# Patient Record
Sex: MALE | Race: WHITE | NOT HISPANIC OR LATINO | Employment: FULL TIME | URBAN - METROPOLITAN AREA
[De-identification: names, ages, dates, MRNs, and addresses within clinical notes are randomized per-mention and may not be internally consistent; named-entity substitution may affect disease eponyms.]

---

## 2018-11-20 ENCOUNTER — OFFICE VISIT (OUTPATIENT)
Dept: URGENT CARE | Facility: CLINIC | Age: 28
End: 2018-11-20
Payer: COMMERCIAL

## 2018-11-20 VITALS
SYSTOLIC BLOOD PRESSURE: 98 MMHG | HEIGHT: 69 IN | BODY MASS INDEX: 26.36 KG/M2 | RESPIRATION RATE: 16 BRPM | OXYGEN SATURATION: 97 % | HEART RATE: 49 BPM | TEMPERATURE: 98.4 F | WEIGHT: 178 LBS | DIASTOLIC BLOOD PRESSURE: 60 MMHG

## 2018-11-20 DIAGNOSIS — J02.9 EXUDATIVE PHARYNGITIS: Primary | ICD-10-CM

## 2018-11-20 DIAGNOSIS — R22.9 MASS OF SKIN: ICD-10-CM

## 2018-11-20 DIAGNOSIS — H66.002 ACUTE SUPPURATIVE OTITIS MEDIA OF LEFT EAR WITHOUT SPONTANEOUS RUPTURE OF TYMPANIC MEMBRANE, RECURRENCE NOT SPECIFIED: ICD-10-CM

## 2018-11-20 LAB
INT CON NEG: NORMAL
INT CON POS: NORMAL
S PYO AG THROAT QL: NEGATIVE

## 2018-11-20 PROCEDURE — 87880 STREP A ASSAY W/OPTIC: CPT | Performed by: PHYSICIAN ASSISTANT

## 2018-11-20 PROCEDURE — 99214 OFFICE O/P EST MOD 30 MIN: CPT | Performed by: PHYSICIAN ASSISTANT

## 2018-11-20 RX ORDER — CEFDINIR 300 MG/1
300 CAPSULE ORAL 2 TIMES DAILY
Qty: 14 CAP | Refills: 0 | Status: SHIPPED | OUTPATIENT
Start: 2018-11-20 | End: 2018-11-27

## 2018-11-20 RX ORDER — CEFDINIR 300 MG/1
300 CAPSULE ORAL 2 TIMES DAILY
Qty: 14 CAP | Refills: 0 | Status: SHIPPED | OUTPATIENT
Start: 2018-11-20 | End: 2018-11-20 | Stop reason: SDUPTHER

## 2018-11-20 NOTE — LETTER
November 20, 2018       Patient: Flakito Choi   YOB: 1990   Date of Visit: 11/20/2018         To Whom It May Concern:    It is my medical opinion that Flakito Choi may be excused from work for the dates of 11/20/18-11/25/18.      If you have any questions or concerns, please don't hesitate to call [phone]          Sincerely,          José Buck P.A.-C.  Electronically Signed

## 2018-11-20 NOTE — PROGRESS NOTES
"Subjective:      Pt is a 28 y.o. male who presents with Pharyngitis            HPI  PT presents to  clinic today complaining of sore throat, pressure in ears,  fatigue, runny nose. PT denies CP, SOB, NVD, abdominal pain, joint pain. Pt also notes scar tissue and mass of skin of forehead region after a cyst was drained and \"it healed wrong\". PT states these symptoms began around 3 days ago. PT states the pain is a 7/10, aching in nature and worse at night.  Pt has not taken any RX medications for this condition. The pt's medication list, problem list, and allergies have been evaluated and reviewed during today's visit.    PMH:  Past Medical History:   Diagnosis Date   • Hyperlipidemia    • No active medical problems        PSH:  Past Surgical History:   Procedure Laterality Date   • CYST EXCISION Bilateral 9/18/2018    Procedure: FACIAL CYST EXCISION;  Surgeon: Rigo Dale M.D.;  Location: SURGERY [facility];  Service: Gen Robotic   • BUNIONECTOMY     • OPEN REDUCTION     • OTHER      left pectorial muscle   • WRIST ORIF Right        Fam Hx:  the patient's family history is not pertinent to their current complaint    Family Status   Relation Status   • Mo Alive   • Fa Alive   • Neg Hx (Not Specified)       Soc HX:  Social History     Social History   • Marital status: Single     Spouse name: N/A   • Number of children: N/A   • Years of education: N/A     Occupational History   • Not on file.     Social History Main Topics   • Smoking status: Never Smoker   • Smokeless tobacco: Never Used   • Alcohol use 2.5 oz/week     5 Cans of beer per week      Comment: moderate   • Drug use: Yes     Types: Marijuana      Comment: anai   • Sexual activity: Not on file     Other Topics Concern   • Not on file     Social History Narrative   • No narrative on file         Medications:    Current Outpatient Prescriptions:   •  cefdinir (OMNICEF) 300 MG Cap, Take 1 Cap by mouth 2 times a day for 7 days., Disp: 14 Cap, " "Rfl: 0      Allergies:  Patient has no known allergies.    ROS  Review of Systems   Constitutional: Positive for malaise/fatigue. Negative for fever.   HENT: Positive for congestion and sore throat from postnasal drip with associated sinus pressure and fullness.    Eyes: Negative for blurred vision, double vision and photophobia.   Respiratory: NEG for cough  Cardiovascular: Negative for chest pain and palpitations.   Gastrointestinal: Negative for nausea, vomiting, abdominal pain, diarrhea and constipation.   Genitourinary: Negative for dysuria and flank pain.   Musculoskeletal: Negative for joint pain and myalgias.   Skin: Negative for itching and rash. +mass of skin on forehead  Neurological:  Negative for dizziness and tingling.   Endo/Heme/Allergies: Does not bruise/bleed easily.   Psychiatric/Behavioral: Negative for depression. The patient is not nervous/anxious.           Objective:     Encounter Vitals  Standard Vitals  Vitals  Blood Pressure: (!) 98/60  Temperature: 36.9 °C (98.4 °F)  Temp src: Temporal  Pulse: (!) 49  Respiration: 16  Pulse Oximetry: 97 %  Height: 175.3 cm (5' 9\")  Weight: 80.7 kg (178 lb)          Physical Exam   Skin: Skin is warm. Capillary refill takes less than 2 seconds. No rash noted.               Constitutional: PT is oriented to person, place, and time. PT appears well-developed and well-nourished. No distress.   HENT:   Head: Normocephalic and atraumatic.   Right Ear: Hearing, tympanic membrane, external ear and ear canal normal.   Left Ear: Hearing, external ear and ear canal normal. Tympanic membrane is erythematous and bulging. A middle ear effusion is present.   Nose: Mucosal edema, rhinorrhea and sinus tenderness present. Right sinus exhibits frontal sinus tenderness. Left sinus exhibits frontal sinus tenderness.   Mouth/Throat: Uvula is midline. Mucous membranes are pale. Posterior oropharyngeal edema and posterior oropharyngeal erythema with exudate noted on exam.   "   Eyes: Conjunctivae normal and EOM are normal. Pupils are equal, round, and reactive to light.   Neck: Normal range of motion. Neck supple. No thyromegaly present.   Cardiovascular: Normal rate, regular rhythm, normal heart sounds and intact distal pulses.  Exam reveals no gallop and no friction rub.    No murmur heard.  Pulmonary/Chest: Effort normal and breath sounds normal. No respiratory distress. PT has no wheezes. PT has no rales. PT exhibits no tenderness.   Abdominal: Soft. Bowel sounds are normal. PT exhibits no distension and no mass. There is no tenderness. There is no rebound and no guarding.   Musculoskeletal: Normal range of motion. PT exhibits no edema and no tenderness.   Lymphadenopathy:     PT has no cervical adenopathy.   Neurological: PT is alert and oriented to person, place, and time. PT displays normal reflexes. No cranial nerve deficit. PT exhibits normal muscle tone. Coordination normal.   Psychiatric: PT has a normal mood and affect. PT behavior is normal. Judgment and thought content normal.        Assessment/Plan:     1. Exudative pharyngitis  Back-up abx if symptoms do not improve in 2-3 days. PT on clinical presentation has exudate on oropharynx and it's possible beyond the strep A testing that this could be a different type of strep (C/G) or A. haemolyticum     - cefdinir (OMNICEF) 300 MG Cap; Take 1 Cap by mouth 2 times a day for 7 days.  Dispense: 14 Cap; Refill: 0  - POCT Rapid Strep A-->NEG    2. Acute suppurative otitis media of left ear without spontaneous rupture of tympanic membrane, recurrence not specified    - cefdinir (OMNICEF) 300 MG Cap; Take 1 Cap by mouth 2 times a day for 7 days.  Dispense: 14 Cap; Refill: 0    3. Mass of skin    - REFERRAL TO PLASTIC SURGERY    Rest, fluids encouraged.  OTC decongestant for congestion  AVS with medical info given.  Pt was in full understanding and agreement with the plan.  Follow-up as needed if symptoms worsen or fail to  improve.

## 2019-04-04 ENCOUNTER — HOSPITAL ENCOUNTER (OUTPATIENT)
Dept: HOSPITAL 8 - RAD | Age: 29
Discharge: HOME | End: 2019-04-04
Attending: FAMILY MEDICINE
Payer: COMMERCIAL

## 2019-04-04 DIAGNOSIS — K82.0: ICD-10-CM

## 2019-04-04 DIAGNOSIS — I86.1: Primary | ICD-10-CM

## 2019-04-04 DIAGNOSIS — R19.00: ICD-10-CM

## 2019-04-04 PROCEDURE — 74176 CT ABD & PELVIS W/O CONTRAST: CPT

## 2019-04-04 PROCEDURE — 93975 VASCULAR STUDY: CPT

## 2019-04-04 PROCEDURE — 76870 US EXAM SCROTUM: CPT
